# Patient Record
Sex: FEMALE | Race: WHITE | NOT HISPANIC OR LATINO | URBAN - METROPOLITAN AREA
[De-identification: names, ages, dates, MRNs, and addresses within clinical notes are randomized per-mention and may not be internally consistent; named-entity substitution may affect disease eponyms.]

---

## 2018-02-09 ENCOUNTER — TELEPHONE (OUTPATIENT)
Dept: NEUROLOGY | Facility: HOSPITAL | Age: 68
End: 2018-02-09

## 2018-02-09 DIAGNOSIS — C7A.019 MALIGNANT CARCINOID TUMOR OF SMALL INTESTINE: Primary | ICD-10-CM

## 2018-02-09 RX ORDER — L-MEFOL/A-CYST/MEB12/ALGAL OIL 6-600-2 MG
1 TABLET ORAL DAILY
COMMUNITY

## 2018-02-09 RX ORDER — LANREOTIDE ACETATE 120 MG/.5ML
120 INJECTION SUBCUTANEOUS
COMMUNITY

## 2018-02-09 RX ORDER — ROSUVASTATIN CALCIUM 10 MG/1
10 TABLET, COATED ORAL DAILY
COMMUNITY

## 2018-02-09 NOTE — LETTER
February 9, 2018        Josafat Rodriguez MD  [address] [facility] [address]             [facility] [place] [address]  Phone: [phone]  Fax: [phone]   Patient: Kerri Ann   MR Number: [id number]   YOB: 1950   Date of Visit: 2/9/2018     Dear Dr. Rodriguez,     We contacted Ms Ann regarding setting up an appointment for an evaluation at our center.  We scheduled a Gallium 68, echocardiogram, blood tumor markers and a pathology re read over the next couple of weeks.  We also scheduled an appointment with Dr. Jurgen Jackson MD on Wednesday, February 28, 2018 at 10:30 AM for recommendations.  We will forward you a copy of the clinic note after the visit.      Thank you for considering our program and for referring this patient.  If you have any questions, please do not hesitate to contact us.      Sincerely,      Dariana Sue RN

## 2018-02-09 NOTE — TELEPHONE ENCOUNTER
----- Message from Sirishaellen Nelson sent at 2/8/2018 11:28 AM CST -----  Contact: Patient  EAW/NEW-Who Called? Patient                                      Referred by:Dr Josafat Rodriguez                                   Why do you need to be seen? 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 Patient is on lanreotide and its not working. Intestines liver                                      Which doctor are you requesting? Dr Jackson                                      You may contact patient back to schedule at:   [phone]  Instructed patient to gather medical records, Cd's and medication list and fax or mail to us asap.

## 2018-02-09 NOTE — TELEPHONE ENCOUNTER
Ordered Gallium scan and echo, tumor markers and path re read per protocol. Appointment made with MD, info sent to patient.

## 2018-02-09 NOTE — LETTER
February 9, 2018    Kerri Ann  [address]  [facility] [id number] [facility]  [address]  [address]  [place] [address]  Phone: [phone]  Fax: [phone] Dear Ms. Ann:    Thank you for your interest in our program. It was my pleasure speaking with you today about your upcoming appointment.     You have a scheduled appointment with Dr Jurgen Jackson MD on Wednesday, February 28, 2018 at 10:30 AM. Our office is located at :    [facility]  Medical Office [facility]  [facility]  [address]  [place], [address]    You are scheduled for a consultation only with the physicians unless otherwise planned. Plan to be here for your visit for 2-3 hrs. If flight arrangements are made, plan to make the return flight after 6 pm if possible. [place] [place] from [facility].    In preparation for your appointment, we ask that you gather the information below and fax them to us ASAP. This will enable us to review all pertinent information at the time of your visit so that recommendations can be made and a plan of care developed for you.     Please return forms along with all paper records via fax asap.    Please fax  1. Insurance cards (front and back)-enlarged if possible  2. Drivers licenses  3. Current medicine list  4. Name, address & phone # of your physician for correspondence  5. Authorization for Release of Information-complete and return to clinic  6. Medical Records-send as soon as you have them together (see guidelines below)    Lab Work: If requested, the special lab markers do require special tubes that have to be ordered by the ordering facility. The lab work should be within 3 months.. The labs take 2-3 weeks to get results so please get labs drawn asap. The name and phone # of the send out lab that does the special labs tests is on the order. You can have  the labs drawn at [facility], [facility], a local hospital, or your doctors office (whichever works). If these lab tests need to be done, I will attach an Outpatient Order form. If you are doing your lab work at a facility other than [facility], call our office to notify us the date you have them drawn and the location and phone number of the lab for easy follow up.    Scans: Please mail copies of CD's of your last scans to the office asap. I would recommend sending them overnight with a tracking number in case of any problems. If you need updated scans, I will attach an Outpatient Order form.    Tissue Biopsy/Pathology: If you had a tissue biopsy or surgery, we will request to have your slides and/or tissue blocks sent to us to perform some special testing on them. Please provide us with a pathology report asa. This testing will be billed to your insurance company.     Operative or Procedure reports: All surgery or procedure reports related to your neuroendocrine tumor should be sent to us.    Insurance Company: You should contact your insurance company to inquire about your insurance coverage and benefits. Ask about co-payments and deductibles when seeing a specialist. Ask if this visit will be in Network or Out of Network. We may be able to work with you if this is out of network for you.    Lodging: Attached is lodging information from the [facility] and a list of local hotels. The [facility] is run by the [employer] and is free of charge. [facility], you must call my office and talk to [facility]. You will need to complete the application and send it to my office for a physician signature. We will forward it to the [facility] and they will check availability. If you wish to stay at the [facility], apply EARLY, they fill up quickly. You may contact the [facility] a week later to confirm your reservation and ask any questions regarding the facility. You  May only stay at the [facility] 24 hrs  prior to your appointment and up to 24 hrs after your appointment. (THIS CAN ONLY BE USED IF YOU LIVE MORE THAN 40 MILES FROM OUR FACILITY).    Call me if you have any questions, email is not the best way to communicate with our office.    We are looking forward to meeting and taking care of you. If you have any questions or concerns, please don't hesitate to call.     Sincerely,        Beth Mireles, RN, BSN  Nurse Manager, Neuroendocrine Tumor Program

## 2018-02-23 ENCOUNTER — TELEPHONE (OUTPATIENT)
Dept: NEUROLOGY | Facility: HOSPITAL | Age: 68
End: 2018-02-23

## 2018-02-23 NOTE — TELEPHONE ENCOUNTER
Phoned Ekaterina back and she stated that she had just faxed over about 60 pages of records on patient.  Also the  rescheduled the patient to March.

## 2018-02-23 NOTE — TELEPHONE ENCOUNTER
Returned call to patient and left a very lengthy VM concerning the lack of records in her chart and that it is imperative that she get us reports annd CD's of all recent scans, path report operative notes, etc and to call us back to discuss as soon as possible. Also mentioned Evette calling her yesterday to discuss as well.  Stated we would need her to get the gallium scan if we have no records.

## 2018-02-23 NOTE — TELEPHONE ENCOUNTER
----- Message from Blanquita Mirza sent at 2/23/2018 10:24 AM CST -----  Contact: Patient  EAW/NEW- Patient has a gallium scan on Monday and an echo on Tuesday and she would like a call back to see if she needs to have this scan done. Patient states she has hadhad these scans done at home in October and is concerned with the amount of scans she has had done. Call back number is [phone].

## 2018-02-23 NOTE — TELEPHONE ENCOUNTER
Phoned Dr Josafat Rodriguez's office and spoke to kindra, his nurse to request records.  Patient saw Dr Grajeda last week in [place] per nurse.  She transferred me to the medical records department and [facility] for themtot fax us whatever they had on file for the patient and to return our call to discuss if necessary.

## 2018-02-23 NOTE — TELEPHONE ENCOUNTER
----- Message from Blanquita Mirza sent at 2/23/2018 10:56 AM CST -----  Contact: Ekaterina with Dr Josafat DEWEY/SILVESTRE Tobar with Dr Josafat Cabello office was returning the nurses call [phone].

## 2018-03-13 ENCOUNTER — TELEPHONE (OUTPATIENT)
Dept: NEUROLOGY | Facility: HOSPITAL | Age: 68
End: 2018-03-13

## 2018-03-13 NOTE — TELEPHONE ENCOUNTER
----- Message from Sirishaellen Nelson sent at 3/12/2018  2:58 PM CDT -----  Contact: Patient  EAW/NEW- Patient's  has a last minute business trip and she would like to reschedule her test and appt with Dr. Jackson. Patient's contact number [phone]

## 2018-03-13 NOTE — TELEPHONE ENCOUNTER
Returned call and LVM to call us back and let the  know when she would be available to reschedule and we can reschedule and let her know that they are confirmed.

## 2018-03-16 ENCOUNTER — TELEPHONE (OUTPATIENT)
Dept: NEUROLOGY | Facility: HOSPITAL | Age: 68
End: 2018-03-16

## 2018-03-16 NOTE — TELEPHONE ENCOUNTER
----- Message from Blanquita Mirza sent at 3/15/2018 12:57 PM CDT -----  Contact: Patient  EAW/NEW- Patient can not make her appointment for the doctor or gallium scan. Patient will need to be scheduled for a later date. Please call patient at [phone].